# Patient Record
Sex: MALE | HISPANIC OR LATINO | URBAN - NONMETROPOLITAN AREA
[De-identification: names, ages, dates, MRNs, and addresses within clinical notes are randomized per-mention and may not be internally consistent; named-entity substitution may affect disease eponyms.]

---

## 2023-08-14 ENCOUNTER — OFFICE VISIT (OUTPATIENT)
Dept: URGENT CARE | Facility: PHYSICIAN GROUP | Age: 28
End: 2023-08-14
Payer: COMMERCIAL

## 2023-08-14 VITALS
BODY MASS INDEX: 29.4 KG/M2 | TEMPERATURE: 98.1 F | DIASTOLIC BLOOD PRESSURE: 72 MMHG | SYSTOLIC BLOOD PRESSURE: 128 MMHG | HEART RATE: 86 BPM | OXYGEN SATURATION: 96 % | RESPIRATION RATE: 16 BRPM | WEIGHT: 210 LBS | HEIGHT: 71 IN

## 2023-08-14 DIAGNOSIS — T78.40XD ALLERGIC REACTION, SUBSEQUENT ENCOUNTER: ICD-10-CM

## 2023-08-14 PROCEDURE — 99213 OFFICE O/P EST LOW 20 MIN: CPT | Performed by: NURSE PRACTITIONER

## 2023-08-14 PROCEDURE — 3078F DIAST BP <80 MM HG: CPT | Performed by: NURSE PRACTITIONER

## 2023-08-14 PROCEDURE — 3074F SYST BP LT 130 MM HG: CPT | Performed by: NURSE PRACTITIONER

## 2023-08-14 RX ORDER — ALBUTEROL SULFATE 90 UG/1
2 AEROSOL, METERED RESPIRATORY (INHALATION) EVERY 6 HOURS PRN
Qty: 8.5 G | Refills: 0 | Status: SHIPPED | OUTPATIENT
Start: 2023-08-14

## 2023-08-14 RX ORDER — CETIRIZINE HYDROCHLORIDE 10 MG/1
10 TABLET ORAL DAILY
Qty: 30 TABLET | Refills: 0 | Status: SHIPPED | OUTPATIENT
Start: 2023-08-14

## 2023-08-14 ASSESSMENT — ENCOUNTER SYMPTOMS
CHILLS: 0
COUGH: 1
PALPITATIONS: 0
NAUSEA: 0
FEVER: 0
MYALGIAS: 0
DIARRHEA: 0
SPUTUM PRODUCTION: 1
SHORTNESS OF BREATH: 1
SINUS PAIN: 1
HEADACHES: 1
ABDOMINAL PAIN: 0
SORE THROAT: 1
VOMITING: 0

## 2023-08-14 NOTE — LETTER
[facility]  [address]  [facility] [id number]     August 14, 2023    Patient: Aubrey Booth   YOB: 1995   Date of Visit: 8/14/2023       To Whom It May Concern:    Aubrey Booth was seen and treated in our department on 8/14/2023.     Sincerely,     HELLEN Fox.

## 2023-08-15 NOTE — PROGRESS NOTES
"Subjective:   Aubrey Booth is a 28 y.o. male who presents for Shortness of Breath (Since 8/2 was seen at [place] and given Kenalog shot, for allergic reaction to possible Wheat, pt works on machinery, since then patient is still having SOB, cough and feels pressure on chest, congestion, light headed, sinus headache, - CV test yesterday )    Patient is a 28-year-old male who presents to clinic today with 12-day history of allergy symptoms including dry cough, intermittent shortness of breath, clear nasal congestion, lightheadedness, sinus pressure, and itchy watery eyes.  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  He was seen at the [place] walk-in was given a Kenalog injection which has helped with his symptoms.  He denies any fever, fatigue, chest pain, nausea, vomiting, or diarrhea.    Review of Systems   Constitutional:  Negative for chills, fever and malaise/fatigue.   HENT:  Positive for congestion, sinus pain and sore throat.    Respiratory:  Positive for cough, sputum production and shortness of breath.    Cardiovascular:  Negative for chest pain and palpitations.   Gastrointestinal:  Negative for abdominal pain, diarrhea, nausea and vomiting.   Musculoskeletal:  Negative for myalgias.   Neurological:  Positive for headaches.       Medications, Allergies, and current problem list reviewed today in Epic.     Objective:     /72   Pulse 86   Temp 36.7 °C (98.1 °F) (Temporal)   Resp 16   Ht 1.803 m (5' 11\")   Wt 95.3 kg (210 lb)   SpO2 96%     Physical Exam  Vitals reviewed.   Constitutional:       General: He is not in acute distress.     Appearance: Normal appearance. He is not ill-appearing.   HENT:      Head: Normocephalic.      Right Ear: Tympanic membrane, ear canal and external ear normal.      Left Ear: Tympanic membrane, ear canal and external ear normal.      Nose: Mucosal edema and rhinorrhea present. No congestion. " Rhinorrhea is clear.      Right Turbinates: Swollen and pale.      Left Turbinates: Swollen and pale.      Mouth/Throat:      Lips: Pink.      Mouth: Mucous membranes are moist.   Eyes:      General:         Right eye: No discharge.         Left eye: No discharge.      Extraocular Movements: Extraocular movements intact.      Conjunctiva/sclera:      Right eye: Right conjunctiva is injected. No exudate.     Left eye: Left conjunctiva is injected. No exudate.     Pupils: Pupils are equal, round, and reactive to light.   Cardiovascular:      Rate and Rhythm: Normal rate and regular rhythm.   Pulmonary:      Effort: Pulmonary effort is normal.      Breath sounds: Normal breath sounds. No wheezing, rhonchi or rales.   Musculoskeletal:         General: Normal range of motion.      Cervical back: Normal range of motion and neck supple.   Lymphadenopathy:      Cervical: No cervical adenopathy.   Skin:     General: Skin is warm and dry.   Neurological:      General: No focal deficit present.      Mental Status: He is alert and oriented to person, place, and time.   Psychiatric:         Mood and Affect: Mood normal.         Behavior: Behavior normal.         Assessment/Plan:     Diagnosis and associated orders:     1. Allergic reaction, subsequent encounter  cetirizine (ZYRTEC ALLERGY) 10 MG Tab    albuterol 108 (90 Base) MCG/ACT Aero Soln inhalation aerosol         Comments/MDM:     Very pleasant 28-year-old male presents to clinic with persistent allergy symptoms after exposure to wheat.  Patient did receive Kenalog shot on 8/2/2023 with some symptom resolution.  We will go ahead and start on Zyrtec and albuterol.  Recommended as needed Benadryl.  Follow-up in clinic or with primary care provider if symptoms continue or worsen.         Differential diagnosis, natural history, supportive care, and indications for immediate follow-up discussed.    Advised the patient to follow-up with the primary care physician for recheck,  reevaluation, and consideration of further management.    Please note that this dictation was created using voice recognition software. I have made a reasonable attempt to correct obvious errors, but I expect that there are errors of grammar and possibly content that I did not discover before finalizing the note.

## 2023-09-16 ENCOUNTER — OFFICE VISIT (OUTPATIENT)
Dept: URGENT CARE | Facility: PHYSICIAN GROUP | Age: 28
End: 2023-09-16
Payer: COMMERCIAL

## 2023-09-16 VITALS
BODY MASS INDEX: 30.06 KG/M2 | RESPIRATION RATE: 16 BRPM | HEIGHT: 70 IN | OXYGEN SATURATION: 97 % | DIASTOLIC BLOOD PRESSURE: 64 MMHG | WEIGHT: 210 LBS | SYSTOLIC BLOOD PRESSURE: 118 MMHG | TEMPERATURE: 97.7 F | HEART RATE: 69 BPM

## 2023-09-16 DIAGNOSIS — M26.622 ARTHRALGIA OF LEFT TEMPOROMANDIBULAR JOINT: ICD-10-CM

## 2023-09-16 PROCEDURE — 99213 OFFICE O/P EST LOW 20 MIN: CPT | Performed by: STUDENT IN AN ORGANIZED HEALTH CARE EDUCATION/TRAINING PROGRAM

## 2023-09-16 PROCEDURE — 3074F SYST BP LT 130 MM HG: CPT | Performed by: STUDENT IN AN ORGANIZED HEALTH CARE EDUCATION/TRAINING PROGRAM

## 2023-09-16 PROCEDURE — 3078F DIAST BP <80 MM HG: CPT | Performed by: STUDENT IN AN ORGANIZED HEALTH CARE EDUCATION/TRAINING PROGRAM

## 2023-09-16 NOTE — PROGRESS NOTES
"Subjective:   Aubrey Booth is a 28 y.o. male who presents for Facial Swelling (Xthis morning, L side of face, swelling is going down, slightly hurt in the morning but gone now )      HPI:  Pleasant 28-year-old male presents to the urgent care for swelling to the left side of his face just in front of his ear.  He reports that he noticed this this morning.  He states that he did have some pain on his TMJ joint on the left side with biting down forcefully.  He denies any dental pain.  Denies any gum swelling or broken teeth.  Denies any ear pain.  No nasal congestion, sinus pressure, sinus pain.  Denies fever, chills, blurred vision, double vision, nausea, vomiting, abdominal pain, diarrhea, dizziness, headache, chest pain, cough, shortness of breath.      Medications:    albuterol Aers  cetirizine Tabs    Allergies: Patient has no known allergies.    Problem List: Aubrey Booth does not have a problem list on file.    Surgical History:  No past surgical history on file.    Past Social Hx: Aubrey Booth  reports that he has never smoked. He has never used smokeless tobacco. He reports that he does not drink alcohol and does not use drugs.     Past Family Hx:  Aubrey Booth family history is not on file.     Problem list, medications, and allergies reviewed by myself today in Epic.     Objective:     /64 (BP Location: Left arm, Patient Position: Sitting, BP Cuff Size: Adult)   Pulse 69   Temp 36.5 °C (97.7 °F) (Temporal)   Resp 16   Ht 1.778 m (5' 10\")   Wt 95.3 kg (210 lb)   SpO2 97%   BMI 30.13 kg/m²     Physical Exam  Vitals reviewed.   Constitutional:       General: He is not in acute distress.     Appearance: Normal appearance.   HENT:      Head: Normocephalic.        Comments: Mild swelling present over the left TMJ joint.  No obvious lymphadenopathy.  Subjective left TMJ pain with forcefully clenching his teeth.  Able to fully open the mouth.  No trismus.  No " pain with lateral movement of the jaw to the left and right.     Right Ear: Tympanic membrane, ear canal and external ear normal.      Left Ear: Tympanic membrane, ear canal and external ear normal.      Nose: No congestion.      Comments: No tenderness to the maxillary or frontal sinuses bilaterally.     Mouth/Throat:      Mouth: Mucous membranes are moist.      Comments: No gingival swelling or broken teeth.  No dental abscess.  Eyes:      Conjunctiva/sclera: Conjunctivae normal.      Pupils: Pupils are equal, round, and reactive to light.   Cardiovascular:      Rate and Rhythm: Normal rate and regular rhythm.      Pulses: Normal pulses.      Heart sounds: Normal heart sounds. No murmur heard.  Pulmonary:      Effort: Pulmonary effort is normal. No respiratory distress.      Breath sounds: Normal breath sounds. No stridor. No wheezing, rhonchi or rales.   Musculoskeletal:      Cervical back: Normal range of motion.   Lymphadenopathy:      Cervical: No cervical adenopathy.   Skin:     General: Skin is warm and dry.   Neurological:      Mental Status: He is alert.         Assessment/Plan:     Diagnosis and associated orders:     1. Arthralgia of left temporomandibular joint           Comments/MDM:     Patient's presentation physical exam findings are consistent with left TMJ pain.  Patient reports that has improved since this morning the swelling has already gone down.  He does still have some pain to the left TMJ with forcefully clenching his teeth.  Advised anti-inflammatories such as ibuprofen, Motrin, or Advil over the next 5 or so days.  Advised to take this with food.  Physical exam not consistent with bacterial sinus infection.  No otitis media bilaterally.  No signs of dental abscess or dental infection.  Vitals all within normal limits.  Patient is agreeable to plan.  ED/return precautions given.         Differential diagnosis, natural history, supportive care, and indications for immediate follow-up  discussed.    Advised the patient to follow-up with the primary care physician for recheck, reevaluation, and consideration of further management.    Please note that this dictation was created using voice recognition software. I have made a reasonable attempt to correct obvious errors, but I expect that there are errors of grammar and possibly content that I did not discover before finalizing the note.    Electronically signed by Lebron Fong PA-C.